# Patient Record
Sex: FEMALE | Race: WHITE
[De-identification: names, ages, dates, MRNs, and addresses within clinical notes are randomized per-mention and may not be internally consistent; named-entity substitution may affect disease eponyms.]

---
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
## 2020-07-12 ENCOUNTER — HOSPITAL ENCOUNTER (EMERGENCY)
Dept: HOSPITAL 62 - ER | Age: 20
Discharge: HOME | End: 2020-07-12
Payer: MEDICAID

## 2020-07-12 VITALS — SYSTOLIC BLOOD PRESSURE: 122 MMHG | DIASTOLIC BLOOD PRESSURE: 74 MMHG

## 2020-07-12 DIAGNOSIS — R21: ICD-10-CM

## 2020-07-12 DIAGNOSIS — L25.5: Primary | ICD-10-CM

## 2020-07-12 DIAGNOSIS — M79.605: ICD-10-CM

## 2020-07-12 DIAGNOSIS — M79.89: ICD-10-CM

## 2020-07-12 PROCEDURE — 96375 TX/PRO/DX INJ NEW DRUG ADDON: CPT

## 2020-07-12 PROCEDURE — 99282 EMERGENCY DEPT VISIT SF MDM: CPT

## 2020-07-12 PROCEDURE — 96374 THER/PROPH/DIAG INJ IV PUSH: CPT

## 2020-07-12 PROCEDURE — 96361 HYDRATE IV INFUSION ADD-ON: CPT

## 2020-07-12 PROCEDURE — S0028 INJECTION, FAMOTIDINE, 20 MG: HCPCS

## 2020-07-12 NOTE — ER DOCUMENT REPORT
Entered by KYRIE VELAZCO SCRIBE  07/12/20 1052 





Acting as scribe for:HUMPHREY CHACON MD





ED General





- General


Chief Complaint: Rash


Stated Complaint: RASH,LEFT LEG PAIN


Time Seen by Provider: 07/12/20 10:17


Information source: Patient


Notes: 





This 20 year old female patient presents to the emergency department today with 

complaints of itching from her rash. Patient states she was doing yard work x5 

days ago when a rash started to show up on her left arm. Patient states it was 

not until x3 days ago that she realized the rash present on all of her 

extremities and face. Patient states there was some swelling under her left eye 

x2 days ago, and it has gone away. Denies pain in her eye or vision changes. 

Patient states calamine, Cortizone, and Benadryl have not provided relief. 


TRAVEL OUTSIDE OF THE [place] IN LAST 30 DAYS: No





- Related Data


Allergies/Adverse Reactions: 


                                        





No Known Allergies Allergy (Verified 07/12/20 10:59)


   








Home Medications: Isibloom





Past Medical History





- General


Information source: Patient





- Social History


Smoking Status: Never Smoker


Cigarette use (# per day): No


Chew tobacco use (# tins/day): No


Frequency of alcohol use: None


Drug Abuse: None


Lives with: Family


Family History: Reviewed & Not Pertinent


Pulmonary Medical History: Reports: Hx Bronchitis


Past Surgical History: Reports: Hx Adenoidectomy, Hx Oral Surgery - wisdom 

teeth, Hx Tonsillectomy





- Immunizations


Immunizations up to date: Yes


Hx Diphtheria, Pertussis, Tetanus Vaccination: Yes





Review of Systems





- Review of Systems


Constitutional: No symptoms reported


EENT: See HPI, Other - Swelling under L eye, No vision changes.  denies: Eye 

pain


Cardiovascular: No symptoms reported


Respiratory: No symptoms reported


Gastrointestinal: No symptoms reported


Genitourinary: No symptoms reported


Female Genitourinary: No symptoms reported


Musculoskeletal: No symptoms reported


Skin: See HPI, Rash - Face, All extremities


Hematologic/Lymphatic: No symptoms reported


Neurological/Psychological: No symptoms reported


-: Yes All other systems reviewed and negative





Physical Exam





- Vital signs


Vitals: 


                                        











Temp Pulse Resp BP Pulse Ox


 


 98.8 F   125 H  20   130/74 H  97 


 


 07/12/20 09:40  07/12/20 09:40  07/12/20 09:40  07/12/20 09:40  07/12/20 09:40














- General


General appearance: Appears well, Alert





- HEENT


Head: Normocephalic, Atraumatic


Eyes: Normal


Pupils: PERRL





- Respiratory


Respiratory status: No respiratory distress


Chest status: Nontender


Breath sounds: Normal


Chest palpation: Normal





- Cardiovascular


Rhythm: Regular


Heart sounds: Normal auscultation


Murmur: No





- Abdominal


Inspection: Normal


Distension: No distension


Bowel sounds: Normal


Tenderness: Nontender





- Extremities


General upper extremity: Normal inspection.  No: Edema


General lower extremity: Normal inspection.  No: Edema





- Neurological


Neuro grossly intact: Yes


Cognition: Normal


Orientation: AAOx4


Speech: Normal





- Psychological


Associated symptoms: Normal affect, Normal mood





- Skin


Skin Temperature: Warm


Skin Moisture: Dry


Notes: 


Diffuse rash on face, neck, anterior/posterior chest and upper/lower 

extremities. 


Rash is maculopapular and weeping. 





Course





- Re-evaluation


Re-evalutation: 





07/12/20 12:26


Patient reports that she feels some better at this point time.  Decreased rash 

as well as decrease itching.





- Vital Signs


Vital signs: 


                                        











Temp Pulse Resp BP Pulse Ox


 


 98.8 F   125 H  20   130/74 H  97 


 


 07/12/20 09:40  07/12/20 09:40  07/12/20 09:40  07/12/20 09:40  07/12/20 09:40











07/12/20 12:27


Vital signs stable





Discharge





- Discharge


Clinical Impression: 


 Contact dermatitis due to plant





Condition: Stable


Disposition: HOME, SELF-CARE


Additional Instructions: 


You have a rash due to contact with plants most likely poison ivy.





We have written prescriptions for you to receive Medrol Dosepak which is a 

steroid taper over the next 6 days.  Also we wrote prescriptions for you to 

receive triamcinolone cream which is a steroid prescription for cream to apply 

liberally to locations of your rash.  There is over-the-counter medication that 

also will be helpful for you and that is taken Pepcid 20 mg twice a day for the 

next 7 to 10 days.  Also Benadryl 25 mg 1 every 6 hours if needed for itch as 

well this may make you sleepy.





As we discussed recommended that you also treat not just yourself but the 

environment as well so therefore every day your close your linen your pillowcase

and bedding all goes in a hot wash on a daily basis until the rash is cleared.


Prescriptions: 


Triamcinolone Acetonide [Aristocort 0.1% Ointment] 1 applic TP BID #60 gm


Methylprednisolone [Medrol Dosepack (4 mg/Tab) 21 Tab/Dosepak] 4 mg PO ASDIR PRN

#21 tab.ds.pk


 PRN Reason: 





I personally performed the services described in the documentation, reviewed and

edited the documentation which was dictated to the scribe in my presence, and it

accurately records my words and actions.